# Patient Record
Sex: MALE | Race: WHITE
[De-identification: names, ages, dates, MRNs, and addresses within clinical notes are randomized per-mention and may not be internally consistent; named-entity substitution may affect disease eponyms.]

---

## 2018-01-08 ENCOUNTER — HOSPITAL ENCOUNTER (OUTPATIENT)
Dept: HOSPITAL 92 - SDC | Age: 23
Discharge: HOME | End: 2018-01-08
Attending: OTOLARYNGOLOGY
Payer: COMMERCIAL

## 2018-01-08 VITALS — BODY MASS INDEX: 23.1 KG/M2

## 2018-01-08 DIAGNOSIS — H72.822: Primary | ICD-10-CM

## 2018-01-08 DIAGNOSIS — H90.2: ICD-10-CM

## 2018-01-08 DIAGNOSIS — Z88.1: ICD-10-CM

## 2018-01-08 DIAGNOSIS — Z88.0: ICD-10-CM

## 2018-01-08 PROCEDURE — 96374 THER/PROPH/DIAG INJ IV PUSH: CPT

## 2018-01-08 PROCEDURE — 96375 TX/PRO/DX INJ NEW DRUG ADDON: CPT

## 2018-01-08 PROCEDURE — 09Q80ZZ REPAIR LEFT TYMPANIC MEMBRANE, OPEN APPROACH: ICD-10-PCS | Performed by: OTOLARYNGOLOGY

## 2018-01-08 NOTE — OP
DATE OF PROCEDURE:  01/08/2018

 

PREOPERATIVE DIAGNOSIS:  Left total perforation.

 

PROCEDURES:

1.  Left tympanoplasty, mastoidectomy without ossicular change reconstruction.

2.  Microscopic surgical procedure.

3.  Facial nerve monitoring for 2 hours.

 

POSTOPERATIVE DIAGNOSIS:  Left total perforation.

 

SURGEON:  Britton De León M.D.

 

ANESTHESIA:  General.

 

COMPLICATIONS:  None.

 

ESTIMATED BLOOD LOSS:  5 mL

 

SPECIMENS:  None.

 

DRAINS:  None.

 

DISPOSITION:  Stable to recovery room.

 

SUMMARY:  Left tympanoplasty, mastoidectomy, chorda tympani nerve intact, ossicles moved well.  Did n
ot specifically identify the incus and the mastoid, excellent mastoid flow through.  The perforation 
was near total and it was basically dissected annulus completely from the 2 o'clock going clockwise t
o the 11 o'clock.  This flap was elevated superiorly and placed the graft in and recompressed the dariusz
ulus back down circumferentially.  We had good skin squamous tissue to use on the canal as well.  The
 middle ear space was benign, lysis of some adhesions between the malleus and promontory.  Gelfoam an
d Gelfilm used.

 

PROCEDURE IN DETAIL:

1.  Left tympanoplasty mastoidectomy without ossicular chain reconstruction:  After informed consent 
was obtained, the patient was taken to the operating room and placed in the supine position.  General
 endotracheal anesthetic was administered.  Table was rotated 180 degrees, left ear was identified an
d promptly injected postauricular and transcanal 0.25% Marcaine with epinephrine.  With the left ear 
prepped and draped in the sterile fashion.  Microscope was brought in view and irrigated with copious
 amounts of saline in the ear canal and elevated tympanomeatal flap from 12 to 5 o'clock position and
 also anterior flap from 11 down to 7 o'clock.  Postauricular incision was made and carried down.  Th
e pericranial flap elevated based anteriorly.  Cortical mastoid was performed and had great flow thro
ugh, did not open the facial recess or identify facial nerve specifically either in the mesotympanum 
or in the mastoid.  When these flaps elevated, good exposure, rimmed the perforation, what caty becker was of it, and debrided the edge of the annulus with a Cortes needle.  Both posterior and anterior f
laps were brought down then into the middle ear space.  I drilled the anterior canal wall down approx
imately 2 mm.  This gave much better exposure.  We then were able to place Gelfilm, Gelfoam and the f
lap underneath the malleus and was brought out well anteriorly, superiorly and inferiorly up about 1 
mm.  They were placed in the annulus down over this, it fit really nicely into this annular groove an
d then compressed it with Gelfilm multiple pieces laterally and then Gelfoam compressed.  Postauricul
ar incision was closed in layers with 2-0 and 3-0 and Dermabond for the skin.  Gelfoam was used in th
e lateral part of the ear canal.  Bacitracin ointment and a cotton ball placed over this.

 

2.  Microscopic surgical procedure:  Throughout the entirety of the operation, a microscope was used 
as integral part of the procedure using 2 to 14 power and high illumination.

 

3.  Facial nerve monitoring for 2 hours:  Beginning the operation, EMG electrodes were placed, orbicu
michelle oculi and orbicularis oris, attached to the neuro integrity monitoring system.  There was no no
rmal stimulation was noted throughout, communication with anesthesia showed that the patient was not 
paralyzed.  The facial nerve was not identified in the mastoid or in the mesotympanum.

 

With these procedures completed, the patient tolerated well and was turned over to Anesthesia in a st
able condition.